# Patient Record
Sex: FEMALE | Race: WHITE | NOT HISPANIC OR LATINO
[De-identification: names, ages, dates, MRNs, and addresses within clinical notes are randomized per-mention and may not be internally consistent; named-entity substitution may affect disease eponyms.]

---

## 2019-10-14 ENCOUNTER — APPOINTMENT (OUTPATIENT)
Dept: ANTEPARTUM | Facility: CLINIC | Age: 34
End: 2019-10-14
Payer: COMMERCIAL

## 2019-10-14 PROCEDURE — 76801 OB US < 14 WKS SINGLE FETUS: CPT

## 2019-10-14 PROCEDURE — 76813 OB US NUCHAL MEAS 1 GEST: CPT

## 2019-11-08 ENCOUNTER — APPOINTMENT (OUTPATIENT)
Dept: ANTEPARTUM | Facility: CLINIC | Age: 34
End: 2019-11-08
Payer: COMMERCIAL

## 2019-11-08 PROCEDURE — 76811 OB US DETAILED SNGL FETUS: CPT

## 2019-11-08 PROCEDURE — 76817 TRANSVAGINAL US OBSTETRIC: CPT | Mod: 59

## 2019-12-06 ENCOUNTER — APPOINTMENT (OUTPATIENT)
Dept: ANTEPARTUM | Facility: CLINIC | Age: 34
End: 2019-12-06
Payer: COMMERCIAL

## 2019-12-06 PROCEDURE — 76811 OB US DETAILED SNGL FETUS: CPT

## 2019-12-06 PROCEDURE — 76817 TRANSVAGINAL US OBSTETRIC: CPT | Mod: 59

## 2019-12-13 ENCOUNTER — APPOINTMENT (OUTPATIENT)
Dept: ANTEPARTUM | Facility: CLINIC | Age: 34
End: 2019-12-13

## 2019-12-31 ENCOUNTER — INPATIENT (INPATIENT)
Facility: HOSPITAL | Age: 34
LOS: 0 days | Discharge: HOME | DRG: 833 | End: 2019-12-31
Attending: OBSTETRICS & GYNECOLOGY | Admitting: OBSTETRICS & GYNECOLOGY
Payer: COMMERCIAL

## 2019-12-31 VITALS
WEIGHT: 176.81 LBS | DIASTOLIC BLOOD PRESSURE: 74 MMHG | TEMPERATURE: 97 F | RESPIRATION RATE: 18 BRPM | OXYGEN SATURATION: 98 % | SYSTOLIC BLOOD PRESSURE: 121 MMHG | HEART RATE: 73 BPM | HEIGHT: 69 IN

## 2019-12-31 LAB
ALBUMIN SERPL ELPH-MCNC: 3.9 G/DL — SIGNIFICANT CHANGE UP (ref 3.3–5)
ALP SERPL-CCNC: 48 U/L — SIGNIFICANT CHANGE UP (ref 40–120)
ALT FLD-CCNC: 23 U/L — SIGNIFICANT CHANGE UP (ref 10–45)
ANION GAP SERPL CALC-SCNC: 11 MMOL/L — SIGNIFICANT CHANGE UP (ref 5–17)
APTT BLD: 30 SEC — SIGNIFICANT CHANGE UP (ref 27.5–36.3)
AST SERPL-CCNC: 19 U/L — SIGNIFICANT CHANGE UP (ref 10–40)
BILIRUB SERPL-MCNC: 0.2 MG/DL — SIGNIFICANT CHANGE UP (ref 0.2–1.2)
BUN SERPL-MCNC: 12 MG/DL — SIGNIFICANT CHANGE UP (ref 7–23)
CALCIUM SERPL-MCNC: 8.8 MG/DL — SIGNIFICANT CHANGE UP (ref 8.4–10.5)
CHLORIDE SERPL-SCNC: 107 MMOL/L — SIGNIFICANT CHANGE UP (ref 96–108)
CO2 SERPL-SCNC: 18 MMOL/L — LOW (ref 22–31)
CREAT SERPL-MCNC: 0.49 MG/DL — LOW (ref 0.5–1.3)
GLUCOSE SERPL-MCNC: 95 MG/DL — SIGNIFICANT CHANGE UP (ref 70–99)
HCT VFR BLD CALC: 34 % — LOW (ref 34.5–45)
HGB BLD-MCNC: 11.3 G/DL — LOW (ref 11.5–15.5)
INR BLD: 0.93 — SIGNIFICANT CHANGE UP (ref 0.88–1.16)
MCHC RBC-ENTMCNC: 32 PG — SIGNIFICANT CHANGE UP (ref 27–34)
MCHC RBC-ENTMCNC: 33.2 GM/DL — SIGNIFICANT CHANGE UP (ref 32–36)
MCV RBC AUTO: 96.3 FL — SIGNIFICANT CHANGE UP (ref 80–100)
NRBC # BLD: 0 /100 WBCS — SIGNIFICANT CHANGE UP (ref 0–0)
PLATELET # BLD AUTO: 279 K/UL — SIGNIFICANT CHANGE UP (ref 150–400)
POTASSIUM SERPL-MCNC: 3.9 MMOL/L — SIGNIFICANT CHANGE UP (ref 3.5–5.3)
POTASSIUM SERPL-SCNC: 3.9 MMOL/L — SIGNIFICANT CHANGE UP (ref 3.5–5.3)
PROT SERPL-MCNC: 6.9 G/DL — SIGNIFICANT CHANGE UP (ref 6–8.3)
PROTHROM AB SERPL-ACNC: 10.5 SEC — SIGNIFICANT CHANGE UP (ref 10–12.9)
RBC # BLD: 3.53 M/UL — LOW (ref 3.8–5.2)
RBC # FLD: 12.8 % — SIGNIFICANT CHANGE UP (ref 10.3–14.5)
SODIUM SERPL-SCNC: 136 MMOL/L — SIGNIFICANT CHANGE UP (ref 135–145)
WBC # BLD: 10 K/UL — SIGNIFICANT CHANGE UP (ref 3.8–10.5)
WBC # FLD AUTO: 10 K/UL — SIGNIFICANT CHANGE UP (ref 3.8–10.5)

## 2019-12-31 PROCEDURE — 80053 COMPREHEN METABOLIC PANEL: CPT

## 2019-12-31 PROCEDURE — 36415 COLL VENOUS BLD VENIPUNCTURE: CPT

## 2019-12-31 PROCEDURE — 85027 COMPLETE CBC AUTOMATED: CPT

## 2019-12-31 PROCEDURE — 85730 THROMBOPLASTIN TIME PARTIAL: CPT

## 2019-12-31 PROCEDURE — 93971 EXTREMITY STUDY: CPT | Mod: 26,LT

## 2019-12-31 PROCEDURE — 85610 PROTHROMBIN TIME: CPT

## 2019-12-31 PROCEDURE — 99285 EMERGENCY DEPT VISIT HI MDM: CPT

## 2019-12-31 PROCEDURE — 93971 EXTREMITY STUDY: CPT

## 2019-12-31 NOTE — ED ADULT NURSE NOTE - OBJECTIVE STATEMENT
Pt presents complaining of lower bilateral leg cramping.  Patient is 25 weeks pregnant.  Denies chest pain or SOB.

## 2019-12-31 NOTE — ED PROVIDER NOTE - CARE PLAN
Principal Discharge DX:	24 weeks gestation of pregnancy  Secondary Diagnosis:	Superficial thrombophlebitis affecting pregnancy or puerperium

## 2019-12-31 NOTE — PROGRESS NOTE ADULT - ASSESSMENT
Pt with a day of left sided superficial vein pain. Pt underwent doppler that revealed SVT 1.5 cm . No DVT noted. FHR noted. Treatment discussed c ER MD. As less then 5 cm no anticoagulant needed . Ice, elevation of limb, activity and baby ASA RX treatment  Return if worsens

## 2019-12-31 NOTE — ED PROVIDER NOTE - CLINICAL SUMMARY MEDICAL DECISION MAKING FREE TEXT BOX
superficial thrombophlebitis 1.5 cm- no dvt- rec asa, cold compresses- will got to OB for fht monitoring

## 2019-12-31 NOTE — ED PROVIDER NOTE - SKIN, MLM
Skin normal color for race, warm, dry and intact. No evidence of rash +left leg scattered varicosities

## 2019-12-31 NOTE — ED PROVIDER NOTE - OBJECTIVE STATEMENT
34 F at 24 weeks GA co L leg pain- hx of varicose veins L leg- near here knee they have increased in size and become very painful- no hx of dvt/pe  no abd pain/lof/vag bleeding  no exac/allev factors  mild swelling to legs

## 2020-01-05 DIAGNOSIS — I80.02 PHLEBITIS AND THROMBOPHLEBITIS OF SUPERFICIAL VESSELS OF LEFT LOWER EXTREMITY: ICD-10-CM

## 2020-01-05 DIAGNOSIS — O22.22: ICD-10-CM

## 2020-01-05 DIAGNOSIS — Z3A.24 24 WEEKS GESTATION OF PREGNANCY: ICD-10-CM

## 2020-01-24 ENCOUNTER — APPOINTMENT (OUTPATIENT)
Dept: ANTEPARTUM | Facility: CLINIC | Age: 35
End: 2020-01-24
Payer: COMMERCIAL

## 2020-01-24 PROCEDURE — 76816 OB US FOLLOW-UP PER FETUS: CPT

## 2020-01-24 PROCEDURE — 76819 FETAL BIOPHYS PROFIL W/O NST: CPT

## 2020-01-24 PROCEDURE — 76817 TRANSVAGINAL US OBSTETRIC: CPT

## 2020-02-28 ENCOUNTER — OUTPATIENT (OUTPATIENT)
Dept: EMERGENCY DEPT | Facility: HOSPITAL | Age: 35
LOS: 1 days | Discharge: HOME | End: 2020-02-28
Payer: COMMERCIAL

## 2020-02-28 VITALS
OXYGEN SATURATION: 100 % | TEMPERATURE: 98 F | SYSTOLIC BLOOD PRESSURE: 104 MMHG | HEART RATE: 74 BPM | DIASTOLIC BLOOD PRESSURE: 73 MMHG | RESPIRATION RATE: 18 BRPM

## 2020-02-28 LAB
ANION GAP SERPL CALC-SCNC: 14 MMOL/L — SIGNIFICANT CHANGE UP (ref 5–17)
APTT BLD: 29.4 SEC — SIGNIFICANT CHANGE UP (ref 27.5–36.3)
BASOPHILS # BLD AUTO: 0.04 K/UL — SIGNIFICANT CHANGE UP (ref 0–0.2)
BASOPHILS NFR BLD AUTO: 0.4 % — SIGNIFICANT CHANGE UP (ref 0–2)
BUN SERPL-MCNC: 8 MG/DL — SIGNIFICANT CHANGE UP (ref 7–23)
CALCIUM SERPL-MCNC: 8.7 MG/DL — SIGNIFICANT CHANGE UP (ref 8.4–10.5)
CHLORIDE SERPL-SCNC: 103 MMOL/L — SIGNIFICANT CHANGE UP (ref 96–108)
CO2 SERPL-SCNC: 21 MMOL/L — LOW (ref 22–31)
CREAT SERPL-MCNC: 0.61 MG/DL — SIGNIFICANT CHANGE UP (ref 0.5–1.3)
EOSINOPHIL # BLD AUTO: 0.37 K/UL — SIGNIFICANT CHANGE UP (ref 0–0.5)
EOSINOPHIL NFR BLD AUTO: 3.9 % — SIGNIFICANT CHANGE UP (ref 0–6)
GLUCOSE SERPL-MCNC: 81 MG/DL — SIGNIFICANT CHANGE UP (ref 70–99)
HCT VFR BLD CALC: 32.1 % — LOW (ref 34.5–45)
HGB BLD-MCNC: 10.6 G/DL — LOW (ref 11.5–15.5)
IMM GRANULOCYTES NFR BLD AUTO: 0.5 % — SIGNIFICANT CHANGE UP (ref 0–1.5)
INR BLD: 0.97 — SIGNIFICANT CHANGE UP (ref 0.88–1.16)
LYMPHOCYTES # BLD AUTO: 1.71 K/UL — SIGNIFICANT CHANGE UP (ref 1–3.3)
LYMPHOCYTES # BLD AUTO: 17.8 % — SIGNIFICANT CHANGE UP (ref 13–44)
MCHC RBC-ENTMCNC: 31.5 PG — SIGNIFICANT CHANGE UP (ref 27–34)
MCHC RBC-ENTMCNC: 33 GM/DL — SIGNIFICANT CHANGE UP (ref 32–36)
MCV RBC AUTO: 95.5 FL — SIGNIFICANT CHANGE UP (ref 80–100)
MONOCYTES # BLD AUTO: 0.57 K/UL — SIGNIFICANT CHANGE UP (ref 0–0.9)
MONOCYTES NFR BLD AUTO: 5.9 % — SIGNIFICANT CHANGE UP (ref 2–14)
NEUTROPHILS # BLD AUTO: 6.87 K/UL — SIGNIFICANT CHANGE UP (ref 1.8–7.4)
NEUTROPHILS NFR BLD AUTO: 71.5 % — SIGNIFICANT CHANGE UP (ref 43–77)
NRBC # BLD: 0 /100 WBCS — SIGNIFICANT CHANGE UP (ref 0–0)
PLATELET # BLD AUTO: 249 K/UL — SIGNIFICANT CHANGE UP (ref 150–400)
POTASSIUM SERPL-MCNC: 3.8 MMOL/L — SIGNIFICANT CHANGE UP (ref 3.5–5.3)
POTASSIUM SERPL-SCNC: 3.8 MMOL/L — SIGNIFICANT CHANGE UP (ref 3.5–5.3)
PROTHROM AB SERPL-ACNC: 11.1 SEC — SIGNIFICANT CHANGE UP (ref 10–12.9)
RBC # BLD: 3.36 M/UL — LOW (ref 3.8–5.2)
RBC # FLD: 13.2 % — SIGNIFICANT CHANGE UP (ref 10.3–14.5)
SODIUM SERPL-SCNC: 138 MMOL/L — SIGNIFICANT CHANGE UP (ref 135–145)
WBC # BLD: 9.61 K/UL — SIGNIFICANT CHANGE UP (ref 3.8–10.5)
WBC # FLD AUTO: 9.61 K/UL — SIGNIFICANT CHANGE UP (ref 3.8–10.5)

## 2020-02-28 PROCEDURE — 99285 EMERGENCY DEPT VISIT HI MDM: CPT

## 2020-02-28 PROCEDURE — 85730 THROMBOPLASTIN TIME PARTIAL: CPT

## 2020-02-28 PROCEDURE — 93970 EXTREMITY STUDY: CPT

## 2020-02-28 PROCEDURE — 85610 PROTHROMBIN TIME: CPT

## 2020-02-28 PROCEDURE — 93970 EXTREMITY STUDY: CPT | Mod: 26

## 2020-02-28 PROCEDURE — 80048 BASIC METABOLIC PNL TOTAL CA: CPT

## 2020-02-28 PROCEDURE — 36415 COLL VENOUS BLD VENIPUNCTURE: CPT

## 2020-02-28 PROCEDURE — 85025 COMPLETE CBC W/AUTO DIFF WBC: CPT

## 2020-02-28 RX ORDER — ASPIRIN/CALCIUM CARB/MAGNESIUM 324 MG
1 TABLET ORAL
Qty: 0 | Refills: 0 | DISCHARGE

## 2020-02-28 NOTE — ED PROVIDER NOTE - CLINICAL SUMMARY MEDICAL DECISION MAKING FREE TEXT BOX
Patient presents to ED with concern for worsening left lower extremity thrombophlebitis.   Patient contacted her ob today and was instructed to come to ED for further eval.  Labs, US LEs ordered on arrival to ED.  Labs reviewed, US completed and pending.  OB team is aware and requesting admission to Dr. Lerner for fetal monitoring and follow up on US studies.  OB agreeable to follow up US reports and complete fetal monitoring, etc.  Patient is aware of plan and in agreement.  Will admit at this time.

## 2020-02-28 NOTE — ED PROVIDER NOTE - NS ED ROS FT
Constitutional: No fever or chills.   Eyes: No pain, blurry vision, or discharge.  ENMT: No hearing changes, pain, discharge or infections. No neck pain or stiffness.  Cardiac: No chest pain, SOB or edema. No chest pain with exertion.  Respiratory: No cough or respiratory distress. No hemoptysis. No history of asthma or RAD.  GI: No nausea, vomiting, diarrhea or abdominal pain.  : No dysuria, frequency or burning.  MS: + LLE pain and swelling.  + "superifical blood clot" to LLE.  No myalgia, muscle weakness, joint pain or back pain.  Neuro: No headache or weakness. No LOC.  Skin: No skin rash.   Endocrine: No history of thyroid disease or diabetes.  Except as documented in the HPI, all other systems are negative.

## 2020-02-28 NOTE — ED ADULT NURSE NOTE - CHPI ED NUR SYMPTOMS NEG
no vomiting/no weakness/no fever/no nausea/no chills/no tingling/no dizziness/no decreased eating/drinking

## 2020-02-28 NOTE — ED ADULT NURSE NOTE - OBJECTIVE STATEMENT
34y F, A&ox3, , 33 weeks pregnant, last menses around /july per pt, presents to ed for LLE swelling, discomfort, and discoloration. Pt reports recently dx with superficial blood clot to LLE and started on aspirin however pt reports increased pain and swelling to lle past few days. Pt went to pcp and was urged to come into ed. PT denies cp, sob, cough, fevers, chills, no n/v. Denies abd pain nor vaginal bleeding nor discharge. LLE swollen with bruising noted. Lungs clear bilateral, iv placed.

## 2020-02-28 NOTE — ED ADULT TRIAGE NOTE - OTHER COMPLAINTS
patient was seen at [facility] ~8 weeks ago for superficial clot and was put on daily ASA but the clot has been getting bigger

## 2020-02-28 NOTE — ED ADULT TRIAGE NOTE - SPO2 (%)
100

## 2020-02-28 NOTE — ED PROVIDER NOTE - PHYSICAL EXAMINATION
VITAL SIGNS: I have reviewed nursing notes and confirm.  CONSTITUTIONAL: Well-developed; well-nourished; in no acute distress.   SKIN:  warm and dry, no acute rash.   HEAD:  normocephalic, atraumatic.  EYES: PERRL.  EOM intact; conjunctiva and sclera clear.  ENT: No nasal discharge; airway clear.   NECK: Supple; non tender.  CARD: S1, S2 normal; no murmurs, gallops, or rubs. Regular rate and rhythm.   RESP:  Clear to auscultation b/l, no wheezes, rales or rhonchi.  ABD: Normal bowel sounds; soft; non-distended; non-tender; no guarding/ rebound.  EXT: + Fullness to left lower extremity just below knee, along medial/posterior calf.  Sensation intact and symmetric to bilateral LEs.  Normal ROM. No clubbing, cyanosis or edema. 2+ pulses to b/l ue/le.  NEURO: Alert, oriented, grossly unremarkable.  5/5 strength x 4 extremities against gravity and external force.  No drift x 4 extremities.  Sensation intact and symmetric x 4 extremities.  No facial asymmetry.    PSYCH: Cooperative, mood and affect appropriate.

## 2020-02-28 NOTE — ED PROVIDER NOTE - OBJECTIVE STATEMENT
34 year old female at 33 weeks gestation presents to ED with concern for worsening of left lower extremity discomfort following detection of superficial thrombosis at apx 23 weeks gestation.  Patient was started on ASA at this time, and notes the pain and swelling had considerably improved until over the past several days.  Patient denies associated fever, chills, chest pain, shortness of breath, headache, visual changes, abdominal pain, nausea, emesis, vaginal bleeding, urinary symptoms, rashes, recent travel or any additional acute complaints or concerns at this time.

## 2020-02-28 NOTE — ED ADULT NURSE NOTE - OTHER COMPLAINTS
patient was seen at [facility] ~8 weeks ago for superficial clot and was put on daily ASA but the clot has been getting bigger

## 2020-02-28 NOTE — ED ADULT NURSE NOTE - NSIMPLEMENTINTERV_GEN_ALL_ED
Implemented All Universal Safety Interventions:  [place] to call system. Call bell, personal items and telephone within reach. Instruct patient to call for assistance. Room bathroom lighting operational. Non-slip footwear when patient is off stretcher. Physically safe environment: no spills, clutter or unnecessary equipment. Stretcher in lowest position, wheels locked, appropriate side rails in place.

## 2020-03-06 ENCOUNTER — APPOINTMENT (OUTPATIENT)
Dept: ANTEPARTUM | Facility: CLINIC | Age: 35
End: 2020-03-06
Payer: COMMERCIAL

## 2020-03-06 PROCEDURE — 76816 OB US FOLLOW-UP PER FETUS: CPT

## 2020-03-06 PROCEDURE — 76819 FETAL BIOPHYS PROFIL W/O NST: CPT

## 2020-03-18 DIAGNOSIS — O26.899 OTHER SPECIFIED PREGNANCY RELATED CONDITIONS, UNSPECIFIED TRIMESTER: ICD-10-CM

## 2020-03-18 DIAGNOSIS — Z3A.00 WEEKS OF GESTATION OF PREGNANCY NOT SPECIFIED: ICD-10-CM

## 2020-03-27 ENCOUNTER — APPOINTMENT (OUTPATIENT)
Dept: ANTEPARTUM | Facility: CLINIC | Age: 35
End: 2020-03-27

## 2022-05-26 ENCOUNTER — EMERGENCY (EMERGENCY)
Facility: HOSPITAL | Age: 37
LOS: 1 days | Discharge: HOME | End: 2022-05-26
Attending: EMERGENCY MEDICINE | Admitting: EMERGENCY MEDICINE
Payer: COMMERCIAL

## 2022-05-26 VITALS
RESPIRATION RATE: 16 BRPM | WEIGHT: 115.08 LBS | HEART RATE: 65 BPM | OXYGEN SATURATION: 98 % | HEIGHT: 69 IN | TEMPERATURE: 98 F | SYSTOLIC BLOOD PRESSURE: 104 MMHG | DIASTOLIC BLOOD PRESSURE: 62 MMHG

## 2022-05-26 DIAGNOSIS — M79.605 PAIN IN LEFT LEG: ICD-10-CM

## 2022-05-26 DIAGNOSIS — Z88.0 ALLERGY STATUS TO PENICILLIN: ICD-10-CM

## 2022-05-26 DIAGNOSIS — Z88.1 ALLERGY STATUS TO OTHER ANTIBIOTIC AGENTS STATUS: ICD-10-CM

## 2022-05-26 DIAGNOSIS — I83.812 VARICOSE VEINS OF LEFT LOWER EXTREMITY WITH PAIN: ICD-10-CM

## 2022-05-26 PROCEDURE — 99284 EMERGENCY DEPT VISIT MOD MDM: CPT | Mod: 25

## 2022-05-26 PROCEDURE — 93971 EXTREMITY STUDY: CPT | Mod: 26,LT

## 2022-05-26 PROCEDURE — 93971 EXTREMITY STUDY: CPT

## 2022-05-26 PROCEDURE — 99284 EMERGENCY DEPT VISIT MOD MDM: CPT

## 2022-05-26 RX ORDER — IBUPROFEN 200 MG
600 TABLET ORAL ONCE
Refills: 0 | Status: COMPLETED | OUTPATIENT
Start: 2022-05-26 | End: 2022-05-26

## 2022-05-26 RX ADMIN — Medication 600 MILLIGRAM(S): at 13:28

## 2022-05-26 NOTE — ED ADULT NURSE NOTE - OBJECTIVE STATEMENT
Pt presenting with L calf pain since yesterday. Endorsing hx of DVT states this pain feels similar. No CP/SOB/palpitations/n/v/d. No redness/swelling noted

## 2022-05-26 NOTE — ED PROVIDER NOTE - MUSCULOSKELETAL, MLM
Spine appears normal, range of motion is not limited, no muscle or joint tenderness; L LE: + varicose veins, no swelling as compared to RLE, no palpable cords, no calf tend, neg dominick's sign, pedal pulses 2+ b/l, FROM, no erythema, no rash, soft compartments

## 2022-05-26 NOTE — ED PROVIDER NOTE - PATIENT PORTAL LINK FT
You can access the FollowMyHealth Patient Portal offered by [facility] by registering at the following website: [email or web address]. By joining [employer]’s FollowMyHealth portal, you will also be able to view your health information using other applications (apps) compatible with our system.

## 2022-05-26 NOTE — ED PROVIDER NOTE - NS ED ATTENDING STATEMENT MOD
This was a shared visit with the SOFY. I reviewed and verified the documentation and independently performed the documented:

## 2022-05-26 NOTE — ED ADULT TRIAGE NOTE - CHIEF COMPLAINT QUOTE
pt complaining of left calf pain and tightness since yesterday after using elliptical rated 6/10 pt hx DVT in 2020 this feels similar no CP SOB or OCP use pt denies numbness/ tingling just reports pain though has recent plane travel

## 2022-05-26 NOTE — ED PROVIDER NOTE - CARE PROVIDERS DIRECT ADDRESSES
[email or web address]

## 2022-05-26 NOTE — ED PROVIDER NOTE - NSFOLLOWUPINSTRUCTIONS_ED_ALL_ED_FT
Varicose Veins  AMBULATORY CARE:  Varicose veins are veins that become large, twisted, and swollen. They are common on the back of the calves, knees, and thighs. Varicose veins are caused by valves in your veins that do not work properly. This causes blood to collect and increase pressure in the veins of your legs. The increased pressure causes your veins to stretch, get larger, swell, and twist.  Varicose Veins  Common symptoms include the following: Your symptoms may be worse after you stand or sit for long periods of time. You may have any of the following:   •Blue, purple, or bulging veins in your legs   •Pain, swelling, or muscle cramps in your legs  •Feeling of fatigue or heaviness in your legs  •Cramping in your legs  Seek care immediately if:   •You have a wound that does not heal or is infected.  •You have an injury that has broken your skin and caused your varicose veins to bleed.  •Your leg is swollen and hard.  •You notice that your legs or feet are turning blue or black.  •Your leg feels warm, tender, and painful. It may look swollen and red.  Contact your healthcare provider if:   •You have pain in your leg that does not go away or gets worse.  •You notice sudden large bruising on your legs.   •You have a rash on your leg.   •Your symptoms keep you from doing your daily activities.  •You have questions or concerns about your condition or care.  Treatment of varicose veins aims to decrease symptoms, improve appearance, and prevent further problems. Treatment will depend on which veins are affected and how severe your condition is. You may need procedures to treat or remove your varicose veins. For example, your healthcare provider may inject a solution or use a laser to close the varicose veins. Surgery to remove long veins may also be done. Ask your healthcare provider for more information about procedures used to treat varicose veins.  Manage varicose veins:   •Do not sit or stand for long periods of time. This can cause the blood to collect in your legs and make your symptoms worse. Bend or rotate your ankles several times every hour. Walk around for a few minutes every hour to get blood moving in your legs.  •Do not cross your legs when you sit. This decreases blood flow to your feet and can make your symptoms worse.  •Do not wear tight clothing or shoes. Do not wear high-heeled shoes. Do not wear clothes that are tight around the waist or knees.  •Maintain a healthy weight. Being overweight or obese can make your varicose veins worse. Ask your healthcare provider how much you should weigh. Ask him or her to help you create a weight loss plan if you are overweight.   •Wear pressure stockings as directed. The stockings are tight and put pressure on your legs. They improve blood flow and help prevent clots.   Pressure Stockings   •Elevate your legs. Keep them above the level of your heart for 15 to 30 minutes several times a day. You can also prop the end of your bed up slightly to elevate your legs while you sleep. This will help blood to flow back to your heart.  Elevate Leg  •Get regular exercise. Talk to your healthcare provider about the best exercise plan for you. Exercise can improve blood flow to your legs and feet.  [employer] Walking for Exercise  Follow up with your doctor as directed: Write down your questions so you remember to ask them during your visits.

## 2022-05-26 NOTE — ED PROVIDER NOTE - ATTENDING APP SHARED VISIT CONTRIBUTION OF CARE
pt seen and examined by me, 37 yo female with left calf soreness.  hx of varicose vein behind left knee, superficial phlebitis in past.  her ob-gyn told her to come in if she ever gets calf pain.  on exam, left medial knee varicose vein, no redness or swelling.  left calf mildly tender.  nv intact.  doppler done, shows no dvt, compressable varicose vein.  pt already has a vascular doctor she can fu for vein.

## 2022-05-26 NOTE — ED PROVIDER NOTE - CARE PROVIDER_API CALL
Wayne Cuellar)  [facility]  Vascular  [address] [place] [address]  Phone: [phone]  Fax: [phone]  Follow Up Time:     Jose Huggins)  Vascular Surgery  [address], [address], [place] [address]  Phone: [phone]  Fax: [phone]  Follow Up Time:

## 2022-05-26 NOTE — ED PROVIDER NOTE - CLINICAL SUMMARY MEDICAL DECISION MAKING FREE TEXT BOX
pt c/o l leg pain and swelling, hx of superficial thrombophlebitis in past (preg provoked) -- never dvt (triage noted - is inaccurate), on exam - varicose veins - no findings concerning for dvt, given ibuprofen, doppler neg, pt advised to use compression stockings and f/u w/vasc surg to address varicose veins, pt understands and agrees w/plan, strict return precautions given

## 2022-05-26 NOTE — ED PROVIDER NOTE - OBJECTIVE STATEMENT
The pt is a 35 y/o F, who presents to ED c/o L leg soreness and veins bulging more then usual. Hx of superficial thrombophlebitis (preg induced), never dvt. Was on elliptical yest, noticed symptoms after -- soreness to leg is 5/10, states that it feels "tight". No OCPs, no recent travel. Denies cp, sob, palpitations, dizziness, syncope, numbness or tingling to toes.

## 2022-05-27 PROBLEM — Z00.00 ENCOUNTER FOR PREVENTIVE HEALTH EXAMINATION: Status: ACTIVE | Noted: 2022-05-27
